# Patient Record
Sex: FEMALE | Race: WHITE
[De-identification: names, ages, dates, MRNs, and addresses within clinical notes are randomized per-mention and may not be internally consistent; named-entity substitution may affect disease eponyms.]

---

## 2017-06-15 ENCOUNTER — HOSPITAL ENCOUNTER (OUTPATIENT)
Dept: HOSPITAL 84 - D.MAMMO | Age: 72
End: 2017-06-15
Attending: FAMILY MEDICINE
Payer: MEDICARE

## 2017-06-15 VITALS — BODY MASS INDEX: 24.3 KG/M2

## 2017-06-15 DIAGNOSIS — Z12.31: Primary | ICD-10-CM

## 2017-11-30 ENCOUNTER — HOSPITAL ENCOUNTER (INPATIENT)
Dept: HOSPITAL 84 - D.MS | Age: 72
LOS: 4 days | Discharge: HOME | DRG: 470 | End: 2017-12-04
Attending: FAMILY MEDICINE | Admitting: FAMILY MEDICINE
Payer: MEDICARE

## 2017-11-30 VITALS
BODY MASS INDEX: 24.91 KG/M2 | BODY MASS INDEX: 24.91 KG/M2 | HEIGHT: 68 IN | HEIGHT: 68 IN | WEIGHT: 164.34 LBS | WEIGHT: 164.34 LBS

## 2017-11-30 VITALS — DIASTOLIC BLOOD PRESSURE: 86 MMHG | SYSTOLIC BLOOD PRESSURE: 151 MMHG

## 2017-11-30 VITALS — DIASTOLIC BLOOD PRESSURE: 74 MMHG | SYSTOLIC BLOOD PRESSURE: 142 MMHG

## 2017-11-30 DIAGNOSIS — Z87.891: ICD-10-CM

## 2017-11-30 DIAGNOSIS — I10: ICD-10-CM
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
## 2017-11-30 DIAGNOSIS — S72.002A: Primary | ICD-10-CM

## 2017-11-30 DIAGNOSIS — F32.9: ICD-10-CM

## 2017-11-30 DIAGNOSIS — M81.0: ICD-10-CM

## 2017-11-30 DIAGNOSIS — E78.5: ICD-10-CM

## 2017-11-30 LAB
ALBUMIN SERPL-MCNC: 3.9 G/DL (ref 3.4–5)
ALP SERPL-CCNC: 93 U/L (ref 46–116)
ALT SERPL-CCNC: 40 U/L (ref 10–68)
ANION GAP SERPL CALC-SCNC: 12.3 MMOL/L (ref 8–16)
APPEARANCE UR: CLEAR
BACTERIA #/AREA URNS HPF: (no result) /HPF
BASOPHILS NFR BLD AUTO: 0.5 % (ref 0–2)
BILIRUB SERPL-MCNC: 0.42 MG/DL (ref 0.2–1.3)
BILIRUB SERPL-MCNC: NEGATIVE MG/DL
BUN SERPL-MCNC: 18 MG/DL (ref 7–18)
CALCIUM SERPL-MCNC: 9.5 MG/DL (ref 8.5–10.1)
CHLORIDE SERPL-SCNC: 104 MMOL/L (ref 98–107)
CO2 SERPL-SCNC: 27.9 MMOL/L (ref 21–32)
COLOR UR: (no result)
CREAT SERPL-MCNC: 0.7 MG/DL (ref 0.6–1.3)
EOSINOPHIL NFR BLD: 1.9 % (ref 0–7)
ERYTHROCYTE [DISTWIDTH] IN BLOOD BY AUTOMATED COUNT: 13.2 % (ref 11.5–14.5)
GLOBULIN SER-MCNC: 3.3 G/L
GLUCOSE SERPL-MCNC: 86 MG/DL (ref 74–106)
GLUCOSE SERPL-MCNC: NEGATIVE MG/DL
HCT VFR BLD CALC: 40.7 % (ref 36–48)
HGB BLD-MCNC: 13.6 G/DL (ref 12–16)
IMM GRANULOCYTES NFR BLD: 0.2 % (ref 0–5)
INR PPP: 0.98 (ref 0.85–1.17)
KETONES UR STRIP-MCNC: NEGATIVE MG/DL
LYMPHOCYTES NFR BLD AUTO: 18.2 % (ref 15–50)
MCH RBC QN AUTO: 32.3 PG (ref 26–34)
MCHC RBC AUTO-ENTMCNC: 33.4 G/DL (ref 31–37)
MCV RBC: 96.7 FL (ref 80–100)
MONOCYTES NFR BLD: 6 % (ref 2–11)
NEUTROPHILS NFR BLD AUTO: 73.2 % (ref 40–80)
NITRITE UR-MCNC: NEGATIVE MG/ML
OSMOLALITY SERPL CALC.SUM OF ELEC: 279 MOSM/KG (ref 275–300)
PH UR STRIP: 6 [PH] (ref 5–6)
PLATELET # BLD: 198 10X3/UL (ref 130–400)
PMV BLD AUTO: 9.2 FL (ref 7.4–10.4)
POTASSIUM SERPL-SCNC: 4.2 MMOL/L (ref 3.5–5.1)
PROT SERPL-MCNC: 7.2 G/DL (ref 6.4–8.2)
PROT UR-MCNC: NEGATIVE MG/DL
PROTHROMBIN TIME: 12.6 SECONDS (ref 11.6–15)
RBC # BLD AUTO: 4.21 10X6/UL (ref 4–5.4)
SODIUM SERPL-SCNC: 140 MMOL/L (ref 136–145)
SP GR UR STRIP: 1.01 (ref 1–1.02)
SQUAMOUS #/AREA URNS HPF: (no result) /HPF (ref 0–5)
UROBILINOGEN UR-MCNC: NORMAL MG/DL
WBC # BLD AUTO: 8.3 10X3/UL (ref 4.8–10.8)
WBC #/AREA URNS HPF: (no result) /HPF (ref 0–5)

## 2017-12-01 VITALS — SYSTOLIC BLOOD PRESSURE: 140 MMHG | DIASTOLIC BLOOD PRESSURE: 65 MMHG

## 2017-12-01 VITALS — DIASTOLIC BLOOD PRESSURE: 63 MMHG | SYSTOLIC BLOOD PRESSURE: 102 MMHG

## 2017-12-01 VITALS — DIASTOLIC BLOOD PRESSURE: 60 MMHG | SYSTOLIC BLOOD PRESSURE: 116 MMHG

## 2017-12-01 VITALS — SYSTOLIC BLOOD PRESSURE: 125 MMHG | DIASTOLIC BLOOD PRESSURE: 65 MMHG

## 2017-12-01 VITALS — DIASTOLIC BLOOD PRESSURE: 83 MMHG | SYSTOLIC BLOOD PRESSURE: 109 MMHG

## 2017-12-01 LAB
ANION GAP SERPL CALC-SCNC: 10.6 MMOL/L (ref 8–16)
BASOPHILS NFR BLD AUTO: 0.3 % (ref 0–2)
BUN SERPL-MCNC: 15 MG/DL (ref 7–18)
CALCIUM SERPL-MCNC: 8.6 MG/DL (ref 8.5–10.1)
CHLORIDE SERPL-SCNC: 107 MMOL/L (ref 98–107)
CO2 SERPL-SCNC: 27.2 MMOL/L (ref 21–32)
CREAT SERPL-MCNC: 0.8 MG/DL (ref 0.6–1.3)
EOSINOPHIL NFR BLD: 2.9 % (ref 0–7)
ERYTHROCYTE [DISTWIDTH] IN BLOOD BY AUTOMATED COUNT: 13.2 % (ref 11.5–14.5)
GLUCOSE SERPL-MCNC: 119 MG/DL (ref 74–106)
HCT VFR BLD CALC: 38.2 % (ref 36–48)
HGB BLD-MCNC: 12.5 G/DL (ref 12–16)
IMM GRANULOCYTES NFR BLD: 0.3 % (ref 0–5)
LYMPHOCYTES NFR BLD AUTO: 27.1 % (ref 15–50)
MCH RBC QN AUTO: 31.9 PG (ref 26–34)
MCHC RBC AUTO-ENTMCNC: 32.7 G/DL (ref 31–37)
MCV RBC: 97.4 FL (ref 80–100)
MONOCYTES NFR BLD: 5.9 % (ref 2–11)
NEUTROPHILS NFR BLD AUTO: 63.5 % (ref 40–80)
OSMOLALITY SERPL CALC.SUM OF ELEC: 282 MOSM/KG (ref 275–300)
PLATELET # BLD: 186 10X3/UL (ref 130–400)
PMV BLD AUTO: 10.1 FL (ref 7.4–10.4)
POTASSIUM SERPL-SCNC: 3.8 MMOL/L (ref 3.5–5.1)
RBC # BLD AUTO: 3.92 10X6/UL (ref 4–5.4)
SODIUM SERPL-SCNC: 141 MMOL/L (ref 136–145)
WBC # BLD AUTO: 6.3 10X3/UL (ref 4.8–10.8)

## 2017-12-01 PROCEDURE — 0SRB0JZ REPLACEMENT OF LEFT HIP JOINT WITH SYNTHETIC SUBSTITUTE, OPEN APPROACH: ICD-10-PCS | Performed by: ORTHOPAEDIC SURGERY

## 2017-12-01 NOTE — NUR
RECEIVED CARE FROM DAY NURSE.  PT LYING IN BED IN LOW FOWLERS POSITION.  EYES
CLOSED.  RESP EVEN AND UNLABORED.  COMPANY AT SIDE.  IV INFUSING TO LEFT FA
PATENT AND INFUSING AS PER ORDER.

## 2017-12-01 NOTE — NUR
ASSESSMENT COMPLETED. PREOP MEDS ADMINISTERED. CONSENT FORMS SIGNED AND
WITNESSED. CALL LIGHT IN REACH. WILL CONTINUE WITH PLAN OF CARE.

## 2017-12-01 NOTE — NUR
PATIENT IS AWAKE, ALERT AND ORIENTED. RIZWAN STRONG IN ROOM TALKING WITH PATIENT.
RESPIRATIONS ARE EVEN AND UNLABORED ON ROOM AIR. NO SIGNS OF DISTRESS NOTED.
BED IN LOWEST POSITION, CALL LIGHT IN REACH. BED RAILS UP X'S 2.

## 2017-12-01 NOTE — NUR
RECEIVED CARE FROM DAY NURSE.  LYING IN LOW FOWLERS POSITION.  REPORTS NO
NEEDS AT THIS TIME.  CALL LIGHT AT SIDE.  IV INFUSING TO PATENT LEFT FA AS PER
ORDER.

## 2017-12-01 NOTE — NUR
NO CHANGES IN INITIAL ASSESSMENT. SCD TO RLE. PINO HOSE TO LLE. BED ALARM ON.
 AT BEDSIDE. CALL LIGHT IN REACH. WILL CONTINUE WITH PLAN OF CARE.

## 2017-12-01 NOTE — NUR
RECEIVED BACK TO ROOM FROM RECOVERY ROOM. O2 @ 2L PER NC. BED ALARM ON. SCD TO
RLE AND PINO HOSE TO LLE.  IN ROOM. CALL LIGHT IN REACH. WILL CONTINUE
WITH PLAN OF CARE.

## 2017-12-02 VITALS — DIASTOLIC BLOOD PRESSURE: 56 MMHG | SYSTOLIC BLOOD PRESSURE: 130 MMHG

## 2017-12-02 VITALS — SYSTOLIC BLOOD PRESSURE: 130 MMHG | DIASTOLIC BLOOD PRESSURE: 58 MMHG

## 2017-12-02 VITALS — DIASTOLIC BLOOD PRESSURE: 58 MMHG | SYSTOLIC BLOOD PRESSURE: 103 MMHG

## 2017-12-02 VITALS — SYSTOLIC BLOOD PRESSURE: 109 MMHG | DIASTOLIC BLOOD PRESSURE: 51 MMHG

## 2017-12-02 VITALS — SYSTOLIC BLOOD PRESSURE: 133 MMHG | DIASTOLIC BLOOD PRESSURE: 49 MMHG

## 2017-12-02 VITALS — SYSTOLIC BLOOD PRESSURE: 125 MMHG | DIASTOLIC BLOOD PRESSURE: 59 MMHG

## 2017-12-02 LAB
ALBUMIN SERPL-MCNC: 3.1 G/DL (ref 3.4–5)
ALP SERPL-CCNC: 78 U/L (ref 46–116)
ALT SERPL-CCNC: 42 U/L (ref 10–68)
ANION GAP SERPL CALC-SCNC: 14.4 MMOL/L (ref 8–16)
BASOPHILS NFR BLD AUTO: 0.2 % (ref 0–2)
BILIRUB SERPL-MCNC: 0.6 MG/DL (ref 0.2–1.3)
BUN SERPL-MCNC: 18 MG/DL (ref 7–18)
CALCIUM SERPL-MCNC: 8.4 MG/DL (ref 8.5–10.1)
CHLORIDE SERPL-SCNC: 106 MMOL/L (ref 98–107)
CO2 SERPL-SCNC: 24.9 MMOL/L (ref 21–32)
CREAT SERPL-MCNC: 1.2 MG/DL (ref 0.6–1.3)
EOSINOPHIL NFR BLD: 0.1 % (ref 0–7)
ERYTHROCYTE [DISTWIDTH] IN BLOOD BY AUTOMATED COUNT: 13.6 % (ref 11.5–14.5)
GLOBULIN SER-MCNC: 2.5 G/L
GLUCOSE SERPL-MCNC: 123 MG/DL (ref 74–106)
HCT VFR BLD CALC: 36.5 % (ref 36–48)
HGB BLD-MCNC: 12 G/DL (ref 12–16)
IMM GRANULOCYTES NFR BLD: 0.3 % (ref 0–5)
LYMPHOCYTES NFR BLD AUTO: 7.1 % (ref 15–50)
MCH RBC QN AUTO: 31.9 PG (ref 26–34)
MCHC RBC AUTO-ENTMCNC: 32.9 G/DL (ref 31–37)
MCV RBC: 97.1 FL (ref 80–100)
MONOCYTES NFR BLD: 6.9 % (ref 2–11)
NEUTROPHILS NFR BLD AUTO: 85.4 % (ref 40–80)
OSMOLALITY SERPL CALC.SUM OF ELEC: 283 MOSM/KG (ref 275–300)
PLATELET # BLD: 201 10X3/UL (ref 130–400)
PMV BLD AUTO: 10.2 FL (ref 7.4–10.4)
POTASSIUM SERPL-SCNC: 4.3 MMOL/L (ref 3.5–5.1)
PROT SERPL-MCNC: 5.6 G/DL (ref 6.4–8.2)
RBC # BLD AUTO: 3.76 10X6/UL (ref 4–5.4)
SODIUM SERPL-SCNC: 141 MMOL/L (ref 136–145)
WBC # BLD AUTO: 11.7 10X3/UL (ref 4.8–10.8)

## 2017-12-02 NOTE — NUR
REHAB PRESCREENING
Rehab referral received.  We will continue to follow this patient for
progress.
Thank you for this referral!
Aster Acosta, [facility]

## 2017-12-02 NOTE — NUR
LATE ENTRY-1000
CM RECEIVED MD ORDER FROM
DR DORMAN REQUESTING AN ACUTE REHAB CONSULT. CM SPOKE WITH
PATRICIA , THE [facility]HAB SCREENER FOR THE WEEKEND, TO ADVISE OF ORDER. CM HAD
NOTED PATIENT DOES NOT HAVE A PHYSICAL THERAPY OR OT ORDER AT THIS TIME.
PATRICIA WILL FOLLOW. POD #1. PRESENT PT ORDER IS FOR OVERHEAD FRAME AND
TRAPEZE. DISCUSSED W/ PRIMARY NURSE.

## 2017-12-02 NOTE — NUR
SITTING UP IN CHAIR AT BEDSE. REQUESTED TO GO BACK TO BED NOW. DRESSING TO
LEFT HIP DRY AND INTACT. PT HERE TO ASSIST TO BED.

## 2017-12-02 NOTE — NUR
ASSESSMENT COMPLETED. TORADOL SIVP WITH AM MEDS EXCEPT FOR BP MEDS. SCD
APPLIED TO LLE ALSO. BED ALARM ON. FRESH ICE PACK APPLIED TO LLE. CALL LIGHT
IN REACH. WILL CONTINUE WITH PLAN OF CARE.

## 2017-12-02 NOTE — NUR
SCANT URINE OUTPUT THROUGHOUT SHIFT.  IN AND OUT CATH PREFORMED USING STERILE
TECHNIQUE.  REMOVAL OF 500ML YELLOW CLEAR URINE REMOVED.

## 2017-12-02 NOTE — OP
PATIENT NAME:  RENETTA OROZCO                          MEDICAL RECORD: [id number]
:47                                             LOCATION:D.MS [id number]
                                                         ADMISSION DATE:17
SURGEON:  ROBERT KU DO         
 
 
DATE OF OPERATION:  2017
 
PROCEDURE PERFORMED:  Left total hip arthroplasty.
 
PREOPERATIVE DIAGNOSIS:  Displaced left femoral neck fracture.
 
POSTOPERATIVE DIAGNOSIS:  Displaced left femoral neck fracture.
 
INDICATIONS:  Ms. Orozco is a 72-year-old female that fell a couple of weeks ago
and had some left hip pain, was limping, thought she has pulled a muscle.  She
finally went to the doctor and was having more difficulty weightbearing, had an
x-ray, and was seen to have a left femoral neck fracture that had displaced. 
Upon discovering that she was direct admit to the hospital last night, I was
consulted to talk to her.  We discussed the options and she decided that a total
hip is what we would do and she was informed of the risks and benefits of the
procedure and consented for the procedure.
 
SURGEON:  Robert Ku DO
 
COMPLICATIONS:  None.
 
ESTIMATED BLOOD LOSS:  100 mL
 
DESCRIPTION OF PROCEDURE:  The patient was given a block in the preoperative
area by anesthesia and taken to the operative suite, laid in supine position,
given general anesthetic and intubated, given a gram of vancomycin for
preoperative antibiotics due to her PENICILLIN ALLERGY.  Once this was done, a
timeout was performed and everyone was in agreement of correct side, site, and
patient.  The left hip was prepared.  The left foot was wrapped and PINO hose
stocking was placed and Webril and then Coban over the foot and was placed Unna
boot.  An anchor was placed on top of the foot and the boot was cinched down. 
Once this was done, Coban was placed on top of the boot to double secure and
make sure the foot would not slip.  After this, the patient was moved over to
the table and then prepped and draped as described in sterile fashion.  After
the time-out, the incision commenced the anterior approach down to the tensor
fascia sampson fascia.  This fascia was incised and taken anterior, the muscle
belly was taken posteriorly, then dissected down to the rectus muscle.  This
fascia was excised and then the Inés retractor was then placed in a
more vertical position and the tensor fascia sampson was taken laterally and the
rectus medially.  The careful dissection was made down to the ascending branch
of lateral femoral circumflex artery.  This was tied off and coagulated with
Aquamantys as well as with the plasma knife and careful dissection was made down
to the hip capsule.  Capsulotomy was performed and tagged.  The femoral neck was
then recut for a cut better suited for the total hip arthroplasty.  The femoral
head and neck were then removed.  The Charnley retractor was then place.  Once
this was done, retractors were placed around the acetabulum.  The labrum was
taken off anteriorly and posteriorly and the pulvinar was removed and any
bleeders were coagulated at that time with Aquamantys.  We then began reaming,
first medialized, and then we started with the 46 and then up to 50.  The 50
implant was put in place at that time.  After this was done, the attention was
then drawn to the femur.  A bone hook was seen, the femur was moved very well
and easily, and the leg was brought into more external rotation and extended and
 
 
 
OPERATIVE REPORT                               [id number]    RENETTA OROZCO        
 
 
abducted as the femur presented well in the incision site.  A retractor was then
used on the medial side, which is the posterior side of the femoral neck as well
as over the greater trochanter to expose the canal.  A cookie cutter was used to
get in the canal and then the canal finder was used to get into the canal.  We
broached up to a 4 that was then trialed and we trialed to a neutral head and
neck with standard offset.  This was confirmed under x-ray and seen to be in
good position.  We then decided to do a collared press fit on the stem with
Medacta.  This was put in and the dual mobility head was put into place and
malleted onto the stem and then the hip was reduced.  Once this was done,
thorough irrigation was done of all the wound.  X-ray was taken and this was
seen to be in very good position.  There were no fractures seen in the distal
femur as we checked.  The capsule was then closed with a #2 Ethibond in a single
figure-of-eight stitch and then Aguila was put in the wound and then the tensor
fascia sampson fascia was closed first with a figure-of-eight stitch of 0 Vicryl
and then running locking stitch of 0 Vicryl over the whole fascia.  We then used
the rest of the Aguila on top of that and the skin was closed with 2-0 Vicryl in
inverted interrupted fashion.  Prineo Dermabond glue was used on the skin to
close the skin.  Telfa and Tegaderm were then placed over the wound and the
patient was awakened and taken to recovery in stable condition.
 
TRANSINT:[id number] Voice Confirmation ID: [id number] DOCUMENT ID: [id number]
                                           
                                           ROBERT KU DO         
 
 
 
Electronically Signed by ROBERT PERRY on 17 at 0747
 
 
 
 
 
 
 
 
 
 
 
 
 
 
 
 
 
 
 
 
CC:                                                             [id number]
DICTATION DATE: 17 1458     :     17 1644      ADM IN  
                                                                              
[facility]                                          
[address] [id number]

## 2017-12-02 NOTE — NUR
TORADOL IVP PER C/O PAIN OF 4 AFTER GETTING UP TO BSC. ONLY VOIDED 50 CC ON
BSC. I&O USING STERILE TECHNIQUE PERFORMED WITH 600 CC URINE RETURN. NO OTHER
CHANGES IN INITIAL ASSESSMENT. SCDs TO BLE. PINO HOSE TO LLE. BED ALARM ON.
CALL LIGHT IN REACH. WILL CONTINUE WITH PLAN OF CARE.

## 2017-12-02 NOTE — NUR
SPOKE WITH PHYSICAL THERAPIST, LISA. HE HAD SPOKEN WITH DR KU THIS AM
REGARDING PHYSICAL THERAPY. PATIENT WAS EVALUATED TODAY. WILL CONTINUE WITH
THERAPY.

## 2017-12-02 NOTE — NUR
BACK IN BED PER PT. TOLERATED WELL BUT NOW REQUESTING PAIN MED. OXY IR 5 MG
PO. VANC IVPB. ICE PACK REFILLED AND PLACED TO LEFT HIP. SCDs BACK ON. BED
ALARM ON. CALL LIGHT IN REACH.

## 2017-12-02 NOTE — NUR
CM WENT TO VISIT WITH THE PATIENT THIS PM. SHE STATES SHE IS STILL A LITTLE
CONFUSED WITH TIME. SHE HAS BEEN OOB TO BATHROOM. IS EXPERIENCING PAIN POST
ACTIVITY. HAS HAD PAIN MANGEMENT ISSUES THIS POD #1. WAS SEEN BY PHYSICAL
THERAPY EARLIER TODAY. THEN OOB TO COMMODE THIS PM. SHE IS NOT CERTAIN SHE
WANTS ACUTE REHAB.
CM WILL REVISIT IN THE AM TO ASSESS AND DISCUSS DISCHARGE PLANS. WILL ALSO
REVIEW IMM. MICHELLE SPOKE WITH THE PRIMARY NURSE, ERA, REGARDING PATIENT'S
COMPLAINT OF PAIN.

## 2017-12-02 NOTE — NUR
VOIDED 50 CC ON BEDPAN. WILL ASSIST TO BSC AFTER DINNER TO SEE IF SHE CAN GO.
IF NOT, WILL I&O CATH PATIENT.

## 2017-12-03 VITALS — SYSTOLIC BLOOD PRESSURE: 116 MMHG | DIASTOLIC BLOOD PRESSURE: 59 MMHG

## 2017-12-03 VITALS — DIASTOLIC BLOOD PRESSURE: 49 MMHG | SYSTOLIC BLOOD PRESSURE: 136 MMHG

## 2017-12-03 VITALS — SYSTOLIC BLOOD PRESSURE: 114 MMHG | DIASTOLIC BLOOD PRESSURE: 47 MMHG

## 2017-12-03 VITALS — SYSTOLIC BLOOD PRESSURE: 133 MMHG | DIASTOLIC BLOOD PRESSURE: 56 MMHG

## 2017-12-03 VITALS — DIASTOLIC BLOOD PRESSURE: 52 MMHG | SYSTOLIC BLOOD PRESSURE: 140 MMHG

## 2017-12-03 VITALS — DIASTOLIC BLOOD PRESSURE: 50 MMHG | SYSTOLIC BLOOD PRESSURE: 121 MMHG

## 2017-12-03 VITALS — SYSTOLIC BLOOD PRESSURE: 140 MMHG | DIASTOLIC BLOOD PRESSURE: 49 MMHG

## 2017-12-03 LAB
ALBUMIN SERPL-MCNC: 2.5 G/DL (ref 3.4–5)
ALP SERPL-CCNC: 63 U/L (ref 46–116)
ALT SERPL-CCNC: 29 U/L (ref 10–68)
ANION GAP SERPL CALC-SCNC: 11.6 MMOL/L (ref 8–16)
BASOPHILS NFR BLD AUTO: 0.1 % (ref 0–2)
BILIRUB SERPL-MCNC: 0.6 MG/DL (ref 0.2–1.3)
BUN SERPL-MCNC: 14 MG/DL (ref 7–18)
CALCIUM SERPL-MCNC: 8.3 MG/DL (ref 8.5–10.1)
CHLORIDE SERPL-SCNC: 105 MMOL/L (ref 98–107)
CO2 SERPL-SCNC: 24.8 MMOL/L (ref 21–32)
CREAT SERPL-MCNC: 0.9 MG/DL (ref 0.6–1.3)
EOSINOPHIL NFR BLD: 0.6 % (ref 0–7)
ERYTHROCYTE [DISTWIDTH] IN BLOOD BY AUTOMATED COUNT: 13.5 % (ref 11.5–14.5)
GLOBULIN SER-MCNC: 2.8 G/L
GLUCOSE SERPL-MCNC: 120 MG/DL (ref 74–106)
HCT VFR BLD CALC: 29.4 % (ref 36–48)
HGB BLD-MCNC: 9.9 G/DL (ref 12–16)
IMM GRANULOCYTES NFR BLD: 0.1 % (ref 0–5)
LYMPHOCYTES NFR BLD AUTO: 12 % (ref 15–50)
MCH RBC QN AUTO: 32 PG (ref 26–34)
MCHC RBC AUTO-ENTMCNC: 33.7 G/DL (ref 31–37)
MCV RBC: 95.1 FL (ref 80–100)
MONOCYTES NFR BLD: 8.9 % (ref 2–11)
NEUTROPHILS NFR BLD AUTO: 78.3 % (ref 40–80)
OSMOLALITY SERPL CALC.SUM OF ELEC: 277 MOSM/KG (ref 275–300)
PLATELET # BLD: 155 10X3/UL (ref 130–400)
PMV BLD AUTO: 10.3 FL (ref 7.4–10.4)
POTASSIUM SERPL-SCNC: 3.4 MMOL/L (ref 3.5–5.1)
PROT SERPL-MCNC: 5.3 G/DL (ref 6.4–8.2)
RBC # BLD AUTO: 3.09 10X6/UL (ref 4–5.4)
SODIUM SERPL-SCNC: 138 MMOL/L (ref 136–145)
WBC # BLD AUTO: 7.7 10X3/UL (ref 4.8–10.8)

## 2017-12-03 NOTE — NUR
Is the patient Alert and Oriented? Yes  0
* How many steps to enter\exit or inside your home? none  0
* PCP DR AGUILAR  0
* Pharmacy [facility] IN PROCESS OF TRYING TO TRANSFER TO [facility]
PHARMACY ON [facility]  0
* Preadmission Environment Home with Family  0
* ADLs Independent  0
* Equipment None  0
* Other Equipment HAS NO DME  0
* List name and contact numbers for known caregivers / representatives who
currently or will assist patient after discharge: NIGEL CHAMBERLAIN- [facility]-
[phone]  0
* Community resources currently utilized None  0
* Please name any agencies selected above. N/A  0
* Additional services required to return to the preadmission environment? Yes
0
* Can the patient safely return to the preadmission environment? Yes  0
* Has this patient been hospitalized within the prior 30 days at any hospital?
No  0

## 2017-12-03 NOTE — NUR
NO CHANGES IN INITIAL ASSESSMENT. SCDs TO BLE. PINO HOSE TO LLE. BED ALARM ON.
CALL LIGHT IN REACH. WILL CONTINUE WITH PLAN OF CARE.

## 2017-12-03 NOTE — NUR
PT IN BED WITH EYES OPEN WATCHING TV. NO CONCERNS OR COMPLAINTS AT THIS TIME.
WILL CONTINUE TO OBSERVE. CALL LIGHT IN REACH.

## 2017-12-03 NOTE — NUR
ASSESSED, PT IS ASLEEP WITH EASY RESPIRATIONS AND NO DISTRESS NOTED. THE BED
IS LOW, RAILS UP X'S 2 WITH THE CALL LIGHT AT HAND/

## 2017-12-03 NOTE — NUR
ASSESSMENT COMPLETED. AM MEDS ADMINISTERED. SCDs TO BLE. PINO HOSE TO LLE. BED
ALARM ON. CALL LIGHT IN REACH. WILL CONTINUE WITH PLAN OF CARE.

## 2017-12-04 VITALS — SYSTOLIC BLOOD PRESSURE: 139 MMHG | DIASTOLIC BLOOD PRESSURE: 67 MMHG

## 2017-12-04 VITALS — DIASTOLIC BLOOD PRESSURE: 49 MMHG | SYSTOLIC BLOOD PRESSURE: 104 MMHG

## 2017-12-04 VITALS — DIASTOLIC BLOOD PRESSURE: 61 MMHG | SYSTOLIC BLOOD PRESSURE: 140 MMHG

## 2017-12-04 LAB
ALBUMIN SERPL-MCNC: 2.3 G/DL (ref 3.4–5)
ALP SERPL-CCNC: 59 U/L (ref 46–116)
ALT SERPL-CCNC: 21 U/L (ref 10–68)
ANION GAP SERPL CALC-SCNC: 10.6 MMOL/L (ref 8–16)
BASOPHILS NFR BLD AUTO: 0.1 % (ref 0–2)
BILIRUB SERPL-MCNC: 0.47 MG/DL (ref 0.2–1.3)
BUN SERPL-MCNC: 8 MG/DL (ref 7–18)
CALCIUM SERPL-MCNC: 8.4 MG/DL (ref 8.5–10.1)
CHLORIDE SERPL-SCNC: 107 MMOL/L (ref 98–107)
CO2 SERPL-SCNC: 25.4 MMOL/L (ref 21–32)
CREAT SERPL-MCNC: 0.6 MG/DL (ref 0.6–1.3)
EOSINOPHIL NFR BLD: 0.9 % (ref 0–7)
ERYTHROCYTE [DISTWIDTH] IN BLOOD BY AUTOMATED COUNT: 13.5 % (ref 11.5–14.5)
GLOBULIN SER-MCNC: 3.1 G/L
GLUCOSE SERPL-MCNC: 118 MG/DL (ref 74–106)
HCT VFR BLD CALC: 28.5 % (ref 36–48)
HGB BLD-MCNC: 9.5 G/DL (ref 12–16)
IMM GRANULOCYTES NFR BLD: 0.1 % (ref 0–5)
LYMPHOCYTES NFR BLD AUTO: 14.5 % (ref 15–50)
MCH RBC QN AUTO: 31.9 PG (ref 26–34)
MCHC RBC AUTO-ENTMCNC: 33.3 G/DL (ref 31–37)
MCV RBC: 95.6 FL (ref 80–100)
MONOCYTES NFR BLD: 9.5 % (ref 2–11)
NEUTROPHILS NFR BLD AUTO: 74.9 % (ref 40–80)
OSMOLALITY SERPL CALC.SUM OF ELEC: 277 MOSM/KG (ref 275–300)
PLATELET # BLD: 169 10X3/UL (ref 130–400)
PMV BLD AUTO: 10.5 FL (ref 7.4–10.4)
POTASSIUM SERPL-SCNC: 3 MMOL/L (ref 3.5–5.1)
PROT SERPL-MCNC: 5.4 G/DL (ref 6.4–8.2)
RBC # BLD AUTO: 2.98 10X6/UL (ref 4–5.4)
SODIUM SERPL-SCNC: 140 MMOL/L (ref 136–145)
WBC # BLD AUTO: 7.7 10X3/UL (ref 4.8–10.8)

## 2017-12-04 NOTE — NUR
Patient's insurance will not pay for HH and OP PT, so patient will go to Dr
office and have her Potassium checked. explained to patient and she stated
that it would not be a problem to do that.

## 2017-12-04 NOTE — NUR
SPOKE WITH DR. DORMAN ABOUT POTASSIUM LEVEL. NEW ORDER FOR ELECTROLYTE PROTOCOL.
STATES HE WILL WRITE DC ORDERS AROUND 1PM. INFORMED PATIENT ABOUT NEW ORDERS
AND WHAT DR. DORMAN SAID. ALSO EXPLAINED TO HER THAT IT WOULD PROBABLY AROUND
2-3 BEFORE HER PAPERWORD IS COMPLETED AND SHE WOULD BE ABLE TO GO HOME.
VERBALIZED UNDERSTANDING.

## 2017-12-04 NOTE — NUR
DC INSTRUCTIONS EXPLAINED TO PATIENT AND . RXs FOR OXY IR AND ELIQUIS
HANDED TO PATIENT. DRSG CHANGED AND EXTRA DRSG SENT WITH PATIENT. DC'D TO
VEHICLE VIA WC WITH .

## 2017-12-04 NOTE — NUR
HAS AMBULATED IN HALLWAY WITH PHYSICAL THERAPY. TOLERATED WELL. IN CHAIR AT
THIS TIME. CALL LIGHT IN REACH.

## 2017-12-04 NOTE — NUR
Rehab Note- Spoke to Morenita Quinn CM. The patient has progressed well
enough to be discharged home to do outpatient therapy. Thank you for this
referral!
Lyubov Villalpando RN
Clinical Liaison, [facility]

## 2017-12-04 NOTE — NUR
PATIENT DISCHARGING TODAY, OP PT SET UP FOR WED DEC 6TH AT 11:45 AM. WALKER
AND BSC ORDERED FROM [facility] AND WILL BE DELIVERED TO HOSPITAL. CM
WILL CONTINUE TO FOLLOW AND ASSIST AS NEEDED WITH DSICHARGE PLANNING NEEDS.

## 2017-12-04 NOTE — NUR
PT IN BED WITH EYES CLOSED AND CHEST RISING. NO S/S OF DISTRESS NOTED. CALL
LIGHT IN REACH. WILL CONTINUE TO OBSERVE.

## 2017-12-04 NOTE — NUR
ASSESSMENT COMPLETED. OXY IR 5 MG PO WITH AM MEDS ADMINISTERED. PINO HOSE TO
LLE AND SCDs TO BLE. BED ALARM ON. CALL LIGHT IN REACH. WILL CONTINUE WITH
PLAN OF CARE.

## 2017-12-04 NOTE — NUR
PT IN BED WITH EYES CLOSED AND CHEST RISING. NO S/S OF DISTRESS. NO CONCERNS
AT THIS TIME. CALL LIGHT IN REACH. WILL CONTINUE TO OBSERVE.

## 2017-12-04 NOTE — NUR
PT IN BED WITH EYES CLOSED AND CHEST RISING. NO S/S OF DISTRESS NOTED AT THIS
TIME. CALL LIGHT IN REACH.

## 2017-12-11 NOTE — HP
PATIENT: RENETTA CHAMBERLAIN                                MEDICAL RECORD: [id number]
ACCOUNT: [id number]                                    LOCATION:D.MS [id number]
: 47                                            ADMISSION DATE: 17
                                                         
 
                             HISTORY AND PHYSICAL EXAMINATION
 
 
DATE OF ADMISSION:  2017
 
CHIEF COMPLAINT:  Left hip pain.
 
HISTORY OF PRESENT ILLNESS:  The patient is a 72-year-old female who states
approximately 2 weeks ago while taking her dog outside, she slipped landing on
her left hip.  She is complaining of intermittent pain since that time with
difficulty ambulating.
 
PAST MEDICAL HISTORY:  Significant that she has had a history of hyperlipidemia,
hypertension, plantar fasciitis, granuloma annulare, prior history of smoking,
Achilles tendinitis, psoriasis, hypokalemia.  She has had a history of
depression, osteoporosis.
 
PAST SURGICAL HISTORY:  She has had a tubal ligation and hysterectomy.
 
FAMILY HISTORY:  Father had hypertension and  of CHF.  Mother had
arteriosclerotic heart disease.
 
ALLERGIES:  PENICILLIN.
 
MEDICATIONS:  Include amlodipine 5 mg 1 p.o. daily, calcium 600 one p.o. daily,
Claritin 10 mg once a day, clobetasol 0.05% topical applying b.i.d., fish oil
1200 mg daily, lisinopril 20/12.5 once a day, Lutein 20 mg once a day, Crestor
10 mg once a day, triamcinolone acetonide 0.1% apply b.i.d., Effexor 37.5 once a
day, vitamin D 1000 international units daily.
 
HABITS:  The patient has been a prior smoker.  She is a moderate alcohol
ingester.
 
REVIEW OF SYSTEMS:
CONSTITUTIONAL:  She denies any headaches, seizures, or syncope.
HEENT:  She denies change in visual or auditory acuity.
PULMONARY:  She denies any shortness of breath, cough or congestion, history of
TB, asthma, or bronchitis.
CARDIOVASCULAR:  No cardiovascular disease noted.
GASTROINTESTINAL:  No chronic nausea, vomiting, melena, or hematochezia.
GENITOURINARY:  No urgency, frequency, or dysuria.
MUSCULOSKELETAL:  The patient does report having chronic ongoing pain in the
left hip.
 
PHYSICAL EXAMINATION:
VITAL SIGNS:  Today, her weight is 164.  Her blood pressure is 166/74, pulse was
slightly elevated at 104, respirations 14, and temperature 96.6.
HEENT:  Head is normocephalic.  No lesions.  Ears:  TMs clear.  Eyes:  Pupils
equal, round and reactive to light.  Her extraocular movements are intact. 
Nasal cavity, oral cavity, oropharynx clear.
NECK:  Supple.  There is no adenopathy.
HEART:  Has a regular rate and rhythm without any murmurs, gallops, or rubs.
LUNGS:  Clear.
 
 
 
HISTORY AND PHYSICAL                           [id number]    RAY,RENETTA LEDEZMA        
 
 
ABDOMEN:  Soft and nontender.
EXTREMITIES:  The patient does have pain with bearing weight on the left leg. 
She has pain with internal and external rotation, flexion, and extension.
 
DIAGNOSTIC DATA:  X-ray of the left hip showed an impacted left femoral neck
fracture.
 
ASSESSMENT:  Status post fall with impacted left femoral neck fracture,
hypertension, depression, history of psoriasis.
 
PLAN:  The patient will be admitted.  We will consult Dr. Hinkle, orthopedist,
for open reduction internal fixation.  The patient will be placed on Dilaudid
for pain.  She will have IV hydration, Zofran for nausea and vomiting, continue
to follow.
 
TRANSINT:[id number] Voice Confirmation ID: [id number] DOCUMENT ID: [id number]
 
 
                                           
                                           RUTHIE AGUILAR MD           
 
 
 
Electronically Signed by RUTHIE AGUILAR on 17 at 0726
 
 
 
 
 
 
 
 
 
 
 
 
 
 
 
 
 
 
 
 
 
 
 
CC:                                                             [id number]
DICTATION DATE: 17 173     :     17 1849      DIS IN  
                                                                      17
[address] [place] [address]

## 2018-08-01 ENCOUNTER — HOSPITAL ENCOUNTER (OUTPATIENT)
Dept: HOSPITAL 84 - D.MAMMO | Age: 73
Discharge: HOME | End: 2018-08-01
Attending: FAMILY MEDICINE
Payer: MEDICARE

## 2018-08-01 VITALS — BODY MASS INDEX: 24.9 KG/M2

## 2018-08-01 DIAGNOSIS — Z12.31: Primary | ICD-10-CM

## 2018-09-06 ENCOUNTER — HOSPITAL ENCOUNTER (OUTPATIENT)
Dept: HOSPITAL 84 - D.MAMMO | Age: 73
Discharge: HOME | End: 2018-09-06
Attending: FAMILY MEDICINE
Payer: MEDICARE

## 2018-09-06 VITALS — BODY MASS INDEX: 24.9 KG/M2

## 2018-09-06 DIAGNOSIS — R92.8: Primary | ICD-10-CM

## 2019-09-25 ENCOUNTER — HOSPITAL ENCOUNTER (OUTPATIENT)
Dept: HOSPITAL 84 - D.MAMMO | Age: 74
End: 2019-09-25
Attending: FAMILY MEDICINE
Payer: MEDICARE

## 2019-09-25 VITALS — BODY MASS INDEX: 24.9 KG/M2

## 2019-09-25 DIAGNOSIS — Z12.31: Primary | ICD-10-CM

## 2025-03-28 NOTE — NUR
PT IN BED WITH  AT BEDSIDE. NO CONCERNS MADE KNOWN. PT IN PLEASANT
MOOD. NOTES DISCOMFORT TO LEFT LOWER EXTREMITY 3/10 AND FEELING INCREASING AND
REQUEST PAIN MEDICATION WITH EVENING MEDICATIONS. CALL LIGHT IN REACH. WILL
CONTINUE TO OBSERVE. Patient awake and alert showing no signs of distress, respirations even and unlabored, cap refill <3 seconds, skin warm, pink and dry and patient appropriately interactive. The patient left the Emergency Department in NAD with mother. . Mom/Dad was encouraged to call or return to the ED for worsening issues or problems and was encouraged to schedule a follow up appointment for continuing care. Mom/Dad verbalized understanding of discharge instructions and prescriptions, all questions were answered. Mom/Dad has no further concerns at this time.